# Patient Record
(demographics unavailable — no encounter records)

---

## 2024-12-10 NOTE — HISTORY OF PRESENT ILLNESS
[de-identified] : pt is having a constant cough and congestion x 2day no fever noted  [FreeTextEntry6] : no vomiting, no diarrhea decreased appetite

## 2024-12-10 NOTE — PLAN
[TextEntry] : continue albuterol PRN symptomatic treatment fluids intake handwashing and infection control discussed recheck if worse/not improving

## 2024-12-27 NOTE — HISTORY OF PRESENT ILLNESS
[de-identified] : HFU dx Pneumonia 12/26/26. Dad reports cough, congestion, fever and runny nose x3 days. Pt was getting worse and was seen at [place] yesterday per dad. Wheezing reported per dad. Xray in ED was clear. Pt reports headache, sore throat and stomachache. Last OTC and albuterol at 7:30 this morning. 103 fever last night. Dad denies vomiting or diarrhea. Normal appetite and voiding reported. [FreeTextEntry6] : in addition to above: Discharged with a primary DX of Flu A at [facility], discharged with instructions to continue albuterol and antipyretics  Patient has had a cough and congestion x 4-5days and fevers x 2-3days, Tm 103F (last night)  She is coughing a lot, but has no signs of resp distress at home She is getting albuterol Q4-6H in addition to her maintenance meds of Symbicort and Singulair Occipital headache when coughing + left sided abdominal pain this AM while eating cereal Appetite is picking up a little bit, had cereal this AM Drinking water and pedialyte Denies V/D, rash Family members has similar symptoms recently

## 2024-12-27 NOTE — PHYSICAL EXAM
[TextEntry] : General: alert and active in no apparent distress Eyes: conjunctiva clear, EOMI, PERRL Ears: TMs translucent bilaterally, normal landmarks noted, normal canals Nose: +clear rhinorrhea, +congestion, no mucosal edema OP: no tonsillar enlargement/exudate, no lesions, no posterior pharyngeal erythema Neck: supple, no adenopathy Lungs: +bibasilar end expiratory wheezing, no retractions, comfortable WOB  CVS: Normal rate, regular rhythm, no murmur Abdomen: soft, nondistended, nontender, and no hepatosplenomegaly or masses, hyperactive bowel sounds Skin: No rashes, lesions or skin changes

## 2024-12-27 NOTE — PLAN
[TextEntry] : - Albuterol 2 puffs with spacer q4-6hr until cough improves, wean as tolerated - Controller medication: Continue current controller medication(s) - Follow up in 1 week or sooner for sx not relieved by albuterol - Emergent care for signs of respiratory distress.  - Maintain hydration, make sure your child drinks small sips of fluids throughout the day. It's normal that they're not eating like they usually do, but hydration is key - Symptomatic treatment with acetaminophen or ibuprofen prn - Saline nose drops, cool mist humidifier   I spent 31 minutes on reviewing pertinent medical records, face to face patient care, and counseling

## 2025-01-03 NOTE — HISTORY OF PRESENT ILLNESS
[de-identified] : follow up visit, dad states pt is still having cough and congestion, no fever, no loose stool noted.  [FreeTextEntry6] : Here for f/u for asthma exacerbation 2/2 flu A Was seen by me on 12/27 as ER f/u at [facility] (CXR normal in ER) At that visit, she had wheezing, so was advised to start albuterol q4-6H, which father has been giving Advised to f/u with me today to recheck lungs  Fevers and diarrhea have resolved but cough is unchanged She gags after coughing fits, but happens "rarely" Denies signs of resp distress No diarrhea, abdominal pain No new symptoms or concerns

## 2025-01-03 NOTE — PLAN
[TextEntry] : Flu A respiratory infection complicated by LLL PNA, will treat with azithromycin today F/u in 4 days, if lung exam not improved by then, will consider starting amoxicillin Cont albuterol Q4-6H - Controller medication: Continue current controller medication(s) - Follow up sooner for sx not relieved by albuterol

## 2025-01-03 NOTE — PHYSICAL EXAM
[TextEntry] : General: alert and active in no apparent distress Eyes: conjunctiva clear Ears: TMs translucent bilaterally, normal landmarks noted, normal canals Nose: no rhinorrhea, no mucosal edema OP: no tonsillar enlargement/exudate, no lesions, no posterior pharyngeal erythema Neck: supple, no adenopathy Lungs: +LLL wheeze and crackles, no retractions, comfortable WOB CVS: Normal rate, regular rhythm, no murmur Abdomen: soft, nondistended, nontender, and no hepatosplenomegaly or masses, normoactive bowel sounds Skin: No rashes, lesions or skin changes

## 2025-01-07 NOTE — PHYSICAL EXAM
[TextEntry] : General: alert and active in no apparent distress Eyes: conjunctiva clear Ears: TMs translucent bilaterally, normal landmarks noted, normal canals Nose: no rhinorrhea, no mucosal edema OP: no tonsillar enlargement/exudate, no lesions, no posterior pharyngeal erythema Neck: supple, no adenopathy Lungs: clear to auscultation bilaterally, good air exchange, no retractions, comfortable WOB CVS: Normal rate, regular rhythm, no murmur Skin: No rashes, lesions or skin changes

## 2025-01-07 NOTE — HISTORY OF PRESENT ILLNESS
[de-identified] : Lungs recheck. [FreeTextEntry6] : Patient is doing "much better" Coughing only here and there - using albuterol only 1x or 2x/day Denies congestion or rhinorrhea Denies F/C/N/V/D/abd pain Energy and sleep back to baseline Appetite at baseline

## 2025-01-07 NOTE — HISTORY OF PRESENT ILLNESS
[de-identified] : Lungs recheck. [FreeTextEntry6] : Patient is doing "much better" Coughing only here and there - using albuterol only 1x or 2x/day Denies congestion or rhinorrhea Denies F/C/N/V/D/abd pain Energy and sleep back to baseline Appetite at baseline

## 2025-01-18 NOTE — DISCUSSION/SUMMARY
[FreeTextEntry1] : 8y F seen for rash on buttocks. incidental finding of red pharynx. Rapid strep neg. If TC positive, Amox BID x 10 days. Mupirocin and hydrocortisone. Skin care reviewed. Supportive care. RTO PRN persistent, worsening, or new concerning symptoms.

## 2025-01-18 NOTE — PHYSICAL EXAM
[NL] : moves all extremities x4, warm, well perfused x4 [de-identified] : dry and inflamed maculopapular rash on b/l buttocks, some scabbing,  [de-identified] : bright red oropharynx

## 2025-01-18 NOTE — PHYSICAL EXAM
[NL] : moves all extremities x4, warm, well perfused x4 [de-identified] : bright red oropharynx  [de-identified] : dry and inflamed maculopapular rash on b/l buttocks, some scabbing,

## 2025-01-18 NOTE — HISTORY OF PRESENT ILLNESS
[de-identified] : As per father, patient developed a rash on left glute 3 days ago, that has spread to right glute since. No other symptoms reported [FreeTextEntry6] : rash on left buttocks started the other day, now on both sides. Pruritis reported. Afebrile and otherwise well.

## 2025-01-18 NOTE — HISTORY OF PRESENT ILLNESS
[de-identified] : As per father, patient developed a rash on left glute 3 days ago, that has spread to right glute since. No other symptoms reported [FreeTextEntry6] : rash on left buttocks started the other day, now on both sides. Pruritis reported. Afebrile and otherwise well.

## 2025-02-14 NOTE — PLAN
[TextEntry] : VIRAL INFECTION:   - Discussed viral etiology and rationale for treatment - RS negative in office today - Maintain hydration - Symptomatic treatment with acetaminophen or ibuprofen prn - Saline nose drops, cool mist humidifier  - Supportive care with fluids and rest - Follow up if symptoms are worsening  If Strep +, start amoxicillin 400mg/5ml at 12.5ml BID x 10days

## 2025-02-14 NOTE — HISTORY OF PRESENT ILLNESS
[de-identified] : sore throat, dry cough, stuffy nose [FreeTextEntry6] : dry cough and congestion x 2 days throat hurts only when coughing +periumbilical abdominal pain but no v/d no fevers appetite normal no s/c at home

## 2025-02-14 NOTE — PHYSICAL EXAM
[TextEntry] : General: alert and active in no apparent distress Eyes: conjunctiva clear, EOMI Ears: TMs translucent bilaterally, normal landmarks noted, normal canals Nose: clear rhinorrhea OP: no tonsillar enlargement/exudate, no lesions, no posterior pharyngeal erythema Neck: supple, no adenopathy Lungs: clear to auscultation bilaterally, good air exchange, no retractions, comfortable WOB CVS: Normal rate, regular rhythm, no murmur Abdomen: soft, nondistended, nontender, and no hepatosplenomegaly or masses, normoactive bowel sounds Skin: No rashes, lesions or skin changes

## 2025-03-18 NOTE — PHYSICAL EXAM
[Erythematous Oropharynx] : erythematous oropharynx [NL] : warm, clear [Enlarged Tonsils] : tonsils not enlarged [Vesicles] : no vesicles [Exudate] : no exudate [Ulcerative Lesions] : no ulcerative lesions [Palate petechiae] : palate without petechiae [Wheezing] : no wheezing [Rales] : no rales [Tachypnea] : no tachypnea [Rhonchi] : no rhonchi

## 2025-03-18 NOTE — HISTORY OF PRESENT ILLNESS
[de-identified] : 8yr old f c/o sore throat 101.0 head ache cough tylenol at 7am [FreeTextEntry6] : Sore throat, headache, cough and fever x 1 day, Tmax 101 Tylenol at 7 am No SOB, difficulty breathing, chest pain, wheeze or stridor. No nausea, vomiting, diarrhea No abdominal pain, rash. No body aches or fatigue. Decreased appetite, drinking and urinating well Has moderate persistent asthma, takes Symbicort and Singulair, grandmother gave Albuterol neb this morning for cough

## 2025-03-18 NOTE — DISCUSSION/SUMMARY
[FreeTextEntry1] : Discussed with grandparent positive strep diagnosis and treatment Complete 10 days of antibiotics. Side effect of antibiotics includes but not limited to diarrhea. Tylenol or Motrin for fever or discomfort, cool mist humidifier, encourage fluids, salt water gargles, probiotics Discard toothbrush after 2 days to prevent re-infection, After being on antibiotics for at least 24 hours patient less likely to spread infection and may return to school if feeling better. Return to office if no improvement or worsening symptoms.

## 2025-03-18 NOTE — REVIEW OF SYSTEMS
[Fever] : fever [Headache] : headache [Sore Throat] : sore throat [Cough] : cough [Negative] : Genitourinary [Tachypnea] : not tachypneic [Wheezing] : no wheezing [Congestion] : no congestion

## 2025-04-14 NOTE — DISCUSSION/SUMMARY
[FreeTextEntry1] : script for nebulizer given and name of pharmacy/supply store given  cont albuterol until cough subsides , may use zyrtec. pepcid ( discussed lactaid chewable)  ER report reviewed via [facility]

## 2025-04-14 NOTE — HISTORY OF PRESENT ILLNESS
[de-identified] : pt seen friday at [facility]  for coughing per dad chest x-ray negative RVP negative, per dad was told to do alb neb q 4 hrs per dad cough improved  needs rx for Neb per dad using family members neb currently  [FreeTextEntry6] : was coughing- saw pulmonologist - increased dose of symbicort - gave prednisone x 5 days- albuterol HFA- but was still coughing -went to ER - CXR(-) switched to nebulizer and helped- cough better and cough is less -  uses zyrtec for allergy, pepcid for upset stomach if has dairy - asking if ok to use

## 2025-05-16 NOTE — DISCUSSION/SUMMARY
[FreeTextEntry1] : 8y F seen for acute visit. Doing well with this asthma exacerbation. Continue current management. If symptoms persist or worsen, consider course of oral steroids. Supportive care. - Discussed with pt / family to observe for signs and symptoms of respiratory distress including the following:  shortness of breath, increased respiratory rate, wheezing, difficulty breathing, sternal notch/intercostal retractions, and/or accessory muscle use during respiration. -Activate EMS or take to ED if stable for prompt medical attention if s/s distress develop. RTO PRN persistent, worsening, or new concerning symptoms.

## 2025-05-16 NOTE — PHYSICAL EXAM
[NL] : warm, clear [FreeTextEntry7] : clear b/l, not tight, no wheezing, no focal sounds, no increased WOB

## 2025-05-16 NOTE — HISTORY OF PRESENT ILLNESS
[de-identified] : 8yr old f c/o cough for 3days Albuterol at 1130am [FreeTextEntry6] : 3 days of asthma exacerbation. Maintained on Symbicort, Montelukast. Parents have been giving Albuterol q4h while awake. [facility]OC notes the patient needed a course of Prednisone < 2 months ago. Sees Dr. Saenz and the [facility]. Last Albuterol was 11:30a.